# Patient Record
Sex: FEMALE | Race: WHITE | NOT HISPANIC OR LATINO | URBAN - METROPOLITAN AREA
[De-identification: names, ages, dates, MRNs, and addresses within clinical notes are randomized per-mention and may not be internally consistent; named-entity substitution may affect disease eponyms.]

---
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
## 2023-02-17 ENCOUNTER — OFFICE VISIT (OUTPATIENT)
Dept: FAMILY MEDICINE CLINIC | Facility: CLINIC | Age: 23
End: 2023-02-17
Payer: COMMERCIAL

## 2023-02-17 VITALS
DIASTOLIC BLOOD PRESSURE: 60 MMHG | SYSTOLIC BLOOD PRESSURE: 102 MMHG | WEIGHT: 135 LBS | TEMPERATURE: 98.2 F | OXYGEN SATURATION: 99 % | HEIGHT: 61 IN | HEART RATE: 71 BPM | BODY MASS INDEX: 25.49 KG/M2

## 2023-02-17 DIAGNOSIS — F32.A ANXIETY AND DEPRESSION: ICD-10-CM

## 2023-02-17 DIAGNOSIS — N30.10 INTERSTITIAL CYSTITIS: ICD-10-CM

## 2023-02-17 DIAGNOSIS — Z00.00 ANNUAL PHYSICAL EXAM: ICD-10-CM

## 2023-02-17 DIAGNOSIS — F41.9 ANXIETY AND DEPRESSION: ICD-10-CM

## 2023-02-17 DIAGNOSIS — L20.9 ATOPIC DERMATITIS, UNSPECIFIED TYPE: ICD-10-CM

## 2023-02-17 DIAGNOSIS — Z76.89 ENCOUNTER TO ESTABLISH CARE: Primary | ICD-10-CM

## 2023-02-17 LAB
ALBUMIN SERPL-MCNC: 4.7 G/DL (ref 3.5–5.2)
ALBUMIN/GLOB SERPL: 2.2 G/DL
ALP SERPL-CCNC: 85 U/L (ref 39–117)
ALT SERPL W P-5'-P-CCNC: 25 U/L (ref 1–33)
ANION GAP SERPL CALCULATED.3IONS-SCNC: 8.9 MMOL/L (ref 5–15)
AST SERPL-CCNC: 19 U/L (ref 1–32)
BASOPHILS # BLD AUTO: 0.02 10*3/MM3 (ref 0–0.2)
BASOPHILS NFR BLD AUTO: 0.2 % (ref 0–1.5)
BILIRUB SERPL-MCNC: 0.7 MG/DL (ref 0–1.2)
BILIRUB UR QL STRIP: NEGATIVE
BUN SERPL-MCNC: 8 MG/DL (ref 6–20)
BUN/CREAT SERPL: 11.9 (ref 7–25)
CALCIUM SPEC-SCNC: 9.6 MG/DL (ref 8.6–10.5)
CHLORIDE SERPL-SCNC: 103 MMOL/L (ref 98–107)
CHOLEST SERPL-MCNC: 182 MG/DL (ref 0–200)
CLARITY UR: CLEAR
CO2 SERPL-SCNC: 30.1 MMOL/L (ref 22–29)
COLOR UR: YELLOW
CREAT SERPL-MCNC: 0.67 MG/DL (ref 0.57–1)
DEPRECATED RDW RBC AUTO: 39.6 FL (ref 37–54)
EGFRCR SERPLBLD CKD-EPI 2021: 128.5 ML/MIN/1.73
EOSINOPHIL # BLD AUTO: 0.27 10*3/MM3 (ref 0–0.4)
EOSINOPHIL NFR BLD AUTO: 3.2 % (ref 0.3–6.2)
ERYTHROCYTE [DISTWIDTH] IN BLOOD BY AUTOMATED COUNT: 11.7 % (ref 12.3–15.4)
GLOBULIN UR ELPH-MCNC: 2.1 GM/DL
GLUCOSE SERPL-MCNC: 103 MG/DL (ref 65–99)
GLUCOSE UR STRIP-MCNC: NEGATIVE MG/DL
HCT VFR BLD AUTO: 44.2 % (ref 34–46.6)
HDLC SERPL-MCNC: 34 MG/DL (ref 40–60)
HGB BLD-MCNC: 15.3 G/DL (ref 12–15.9)
HGB UR QL STRIP.AUTO: ABNORMAL
IMM GRANULOCYTES # BLD AUTO: 0.02 10*3/MM3 (ref 0–0.05)
IMM GRANULOCYTES NFR BLD AUTO: 0.2 % (ref 0–0.5)
KETONES UR QL STRIP: ABNORMAL
LDLC SERPL CALC-MCNC: 104 MG/DL (ref 0–100)
LDLC/HDLC SERPL: 2.84 {RATIO}
LEUKOCYTE ESTERASE UR QL STRIP.AUTO: NEGATIVE
LYMPHOCYTES # BLD AUTO: 1.85 10*3/MM3 (ref 0.7–3.1)
LYMPHOCYTES NFR BLD AUTO: 21.7 % (ref 19.6–45.3)
MCH RBC QN AUTO: 32.1 PG (ref 26.6–33)
MCHC RBC AUTO-ENTMCNC: 34.6 G/DL (ref 31.5–35.7)
MCV RBC AUTO: 92.7 FL (ref 79–97)
MONOCYTES # BLD AUTO: 0.55 10*3/MM3 (ref 0.1–0.9)
MONOCYTES NFR BLD AUTO: 6.5 % (ref 5–12)
NEUTROPHILS NFR BLD AUTO: 5.81 10*3/MM3 (ref 1.7–7)
NEUTROPHILS NFR BLD AUTO: 68.2 % (ref 42.7–76)
NITRITE UR QL STRIP: NEGATIVE
NRBC BLD AUTO-RTO: 0 /100 WBC (ref 0–0.2)
PH UR STRIP.AUTO: 6 [PH] (ref 5–8)
PLATELET # BLD AUTO: 282 10*3/MM3 (ref 140–450)
PMV BLD AUTO: 10.8 FL (ref 6–12)
POTASSIUM SERPL-SCNC: 3.5 MMOL/L (ref 3.5–5.2)
PROT SERPL-MCNC: 6.8 G/DL (ref 6–8.5)
PROT UR QL STRIP: NEGATIVE
RBC # BLD AUTO: 4.77 10*6/MM3 (ref 3.77–5.28)
SODIUM SERPL-SCNC: 142 MMOL/L (ref 136–145)
SP GR UR STRIP: 1.02 (ref 1–1.03)
T-UPTAKE NFR SERPL: 1.04 TBI (ref 0.8–1.3)
T4 SERPL-MCNC: 7.86 MCG/DL (ref 4.5–11.7)
TRIGL SERPL-MCNC: 258 MG/DL (ref 0–150)
TSH SERPL DL<=0.05 MIU/L-ACNC: 1.04 UIU/ML (ref 0.27–4.2)
UROBILINOGEN UR QL STRIP: ABNORMAL
VLDLC SERPL-MCNC: 44 MG/DL (ref 5–40)
WBC NRBC COR # BLD: 8.52 10*3/MM3 (ref 3.4–10.8)

## 2023-02-17 PROCEDURE — 99385 PREV VISIT NEW AGE 18-39: CPT

## 2023-02-17 PROCEDURE — 84479 ASSAY OF THYROID (T3 OR T4): CPT

## 2023-02-17 PROCEDURE — 2014F MENTAL STATUS ASSESS: CPT

## 2023-02-17 PROCEDURE — 3008F BODY MASS INDEX DOCD: CPT

## 2023-02-17 PROCEDURE — 84436 ASSAY OF TOTAL THYROXINE: CPT

## 2023-02-17 PROCEDURE — 85025 COMPLETE CBC W/AUTO DIFF WBC: CPT

## 2023-02-17 PROCEDURE — 80061 LIPID PANEL: CPT

## 2023-02-17 PROCEDURE — 80053 COMPREHEN METABOLIC PANEL: CPT

## 2023-02-17 PROCEDURE — 84443 ASSAY THYROID STIM HORMONE: CPT

## 2023-02-17 PROCEDURE — 81001 URINALYSIS AUTO W/SCOPE: CPT

## 2023-02-17 RX ORDER — DESONIDE 0.5 MG/G
1 OINTMENT TOPICAL 2 TIMES DAILY
Qty: 60 G | Refills: 1 | Status: SHIPPED | OUTPATIENT
Start: 2023-02-17

## 2023-02-17 NOTE — PROGRESS NOTES
Chief Complaint  Chief Complaint   Patient presents with   • Establish Care       Subjective      Danni Solomon presents to [facility]  History of Present Illness  Patient presents today to establish care. She has not seen a PCP in several years. She has seen a therapist for her mental health in the past but hasn't seen anyone recently.    She reports a history of a bladder problem, possibly interstitial cystitis. Urinary symptoms include urgency, frequency. She does drink a lot of water to avoid flare ups of the interstitial cystitis. These symptoms are sporadic and random. She denies any current pain with urination.     PHQ 9 score of 15. She has seen mental health providers in the past and was prescribed medications, but she did not want to take them.  Today she is interested in seeing a new therapist to discuss her depression and anxiety.  She denies any suicidal or homicidal ideations at this time.    Discussed immunizations and patient declines any additional immunizations at this time.    Objective     Medical History:  Past Medical History:   Diagnosis Date   • Anxiety    • Broken bones    • Depression    • Forgetfulness      Past Surgical History:   Procedure Laterality Date   • SYNDACTYLY REPAIR      Webbed feet      Social History     Tobacco Use   • Smoking status: Never   • Smokeless tobacco: Never   Substance Use Topics   • Alcohol use: Yes     Comment: rarely   • Drug use: Not Currently     Types: Marijuana     Family History   Problem Relation Age of Onset   • Heart disease Mother        Medications:  Prior to Admission medications    Not on File        Allergies:   Patient has no known allergies.    Health Maintenance Due   Topic Date Due   • COVID-19 Vaccine (1) Never done   • HPV VACCINES (1 - 2-dose series) Never done   • HEPATITIS C SCREENING  Never done   • ANNUAL PHYSICAL  Never done   • INFLUENZA VACCINE  Never done         Vital Signs:   /60  "  Pulse 71   Temp 98.2 °F (36.8 °C)   Ht 154.9 cm (61\")   Wt 61.2 kg (135 lb)   SpO2 99%   BMI 25.51 kg/m²     Wt Readings from Last 3 Encounters:   02/17/23 61.2 kg (135 lb)   10/17/19 55.8 kg (123 lb) (51 %, Z= 0.01)*   10/15/19 50.8 kg (112 lb) (27 %, Z= -0.62)*     * Growth percentiles are based on CDC (Girls, 2-20 Years) data.     BP Readings from Last 3 Encounters:   02/17/23 102/60   10/17/19 120/70 (87 %, Z = 1.13 /  75 %, Z = 0.67)*   02/12/19 102/68 (30 %, Z = -0.52 /  68 %, Z = 0.47)*     *BP percentiles are based on the 2017 AAP Clinical Practice Guideline for girls          Physical Exam  Vitals reviewed.   Constitutional:       Appearance: Normal appearance. She is well-developed.   HENT:      Head: Normocephalic and atraumatic.   Eyes:      Conjunctiva/sclera: Conjunctivae normal.      Pupils: Pupils are equal, round, and reactive to light.   Cardiovascular:      Rate and Rhythm: Normal rate and regular rhythm.      Heart sounds: No murmur heard.    No friction rub. No gallop.   Pulmonary:      Effort: Pulmonary effort is normal.      Breath sounds: Normal breath sounds. No wheezing or rhonchi.   Abdominal:      General: Bowel sounds are normal. There is no distension.      Palpations: Abdomen is soft.      Tenderness: There is no abdominal tenderness.   Skin:     General: Skin is warm and dry.   Neurological:      Mental Status: She is alert and oriented to person, place, and time.      Cranial Nerves: No cranial nerve deficit.   Psychiatric:         Mood and Affect: Affect normal. Mood is anxious.         Behavior: Behavior normal.         Thought Content: Thought content normal.         Judgment: Judgment normal.          Result Review :    The following data was reviewed by TRENA Back on 02/17/23 at 15:50 EST:        No Images in the past 120 days found..                  Assessment and Plan    Diagnoses and all orders for this visit:    1. Encounter to establish care " (Primary)    2. Annual physical exam  -     CBC & Differential  -     Comprehensive Metabolic Panel  -     Lipid Panel  -     Thyroid Panel With TSH  -     Urinalysis With Culture If Indicated -    3. Anxiety and depression  Assessment & Plan:  Patient declines medication at this time.  She was provided a list of local therapists so that she can schedule an appointment and find someone that she has a good relationship with to discuss her anxiety and any other evaluation she may need.      4. Interstitial cystitis  Assessment & Plan:  Advised patient to increase water intake and avoid caffeine as this can be an irritant to the bladder.  Urinalysis with culture if indicated to be collected today.      5. Atopic dermatitis, unspecified type  -     desonide (DESOWEN) 0.05 % ointment; Apply 1 application topically to the appropriate area as directed 2 (Two) Times a Day.  Dispense: 60 g; Refill: 1     Patient will follow-up with me in 3 months or sooner if needed to continue monitoring and discussing anxiety and depression after she establishes with a therapist and determines if she needs additional evaluation for proper diagnosis.  At that time we will decide which medication would be most appropriate for the patient.      Smoking Cessation:    Danni Solomon  reports that she has never smoked. She has never used smokeless tobacco.            Follow Up   Return in about 3 months (around 5/17/2023) for Next scheduled follow up.  Patient was given instructions and counseling regarding her condition or for health maintenance advice. Please see specific information pulled into the AVS if appropriate.     Please note that portions of this note were completed with a voice recognition program.

## 2023-02-17 NOTE — ASSESSMENT & PLAN NOTE
Advised patient to increase water intake and avoid caffeine as this can be an irritant to the bladder.  Urinalysis with culture if indicated to be collected today.

## 2023-02-17 NOTE — ASSESSMENT & PLAN NOTE
Patient declines medication at this time.  She was provided a list of local therapists so that she can schedule an appointment and find someone that she has a good relationship with to discuss her anxiety and any other evaluation she may need.

## 2023-02-18 LAB
BACTERIA UR QL AUTO: ABNORMAL /HPF
HYALINE CASTS UR QL AUTO: ABNORMAL /LPF
MUCOUS THREADS URNS QL MICRO: ABNORMAL /HPF
RBC # UR STRIP: ABNORMAL /HPF
REF LAB TEST METHOD: ABNORMAL
SQUAMOUS #/AREA URNS HPF: ABNORMAL /HPF
WBC # UR STRIP: ABNORMAL /HPF

## 2023-05-17 ENCOUNTER — OFFICE VISIT (OUTPATIENT)
Dept: FAMILY MEDICINE CLINIC | Facility: CLINIC | Age: 23
End: 2023-05-17
Payer: COMMERCIAL

## 2023-05-17 VITALS
HEIGHT: 61 IN | HEART RATE: 79 BPM | TEMPERATURE: 98.5 F | DIASTOLIC BLOOD PRESSURE: 78 MMHG | BODY MASS INDEX: 24.81 KG/M2 | OXYGEN SATURATION: 99 % | SYSTOLIC BLOOD PRESSURE: 124 MMHG | WEIGHT: 131.4 LBS

## 2023-05-17 DIAGNOSIS — F32.A ANXIETY AND DEPRESSION: Primary | ICD-10-CM

## 2023-05-17 DIAGNOSIS — F41.9 ANXIETY AND DEPRESSION: Primary | ICD-10-CM

## 2023-05-17 DIAGNOSIS — L21.9 SEBORRHEIC DERMATITIS: ICD-10-CM

## 2023-05-17 DIAGNOSIS — Z13.39 ADHD (ATTENTION DEFICIT HYPERACTIVITY DISORDER) EVALUATION: ICD-10-CM

## 2023-05-17 RX ORDER — KETOCONAZOLE 20 MG/ML
SHAMPOO TOPICAL 2 TIMES WEEKLY
Qty: 120 ML | Refills: 1 | Status: SHIPPED | OUTPATIENT
Start: 2023-05-18

## 2023-05-17 NOTE — PROGRESS NOTES
Chief Complaint  Chief Complaint   Patient presents with   • Anxiety   • Depression   • Hair/Scalp Problem     Dermatology referral, sores on scalp, x2 years        Subjective      Danni Solomon presents to [facility]  History of Present Illness  Patient presents today for follow-up visit on depression/anxiety, dermatitis.      Depression/anxiety: Patient was previously provided a list of local therapists but she lost the list and has not been able to find a therapist or schedule an appointment for further discussion and evaluation.  Patient states that she has a strong family history of ADHD and feels that she has many of the symptoms herself including inability to stay focused and concentrate, inability to complete tasks.    Seborrheic dermatitis: Patient complains of persistent dry skin and sores on her scalp.  She has tried many different shampoos including tea tree oil shampoo with no improvement.  The area is itchy and bothersome to her.      Objective     Medical History:  Past Medical History:   Diagnosis Date   • Anxiety    • Broken bones    • Depression    • Forgetfulness      Past Surgical History:   Procedure Laterality Date   • SYNDACTYLY REPAIR      Webbed feet      Social History     Tobacco Use   • Smoking status: Never   • Smokeless tobacco: Never   Vaping Use   • Vaping Use: Some days   Substance Use Topics   • Alcohol use: Yes     Comment: rarely   • Drug use: Not Currently     Types: Marijuana     Family History   Problem Relation Age of Onset   • Heart disease Mother        Medications:  Prior to Admission medications    Medication Sig Start Date End Date Taking? Authorizing Provider   desonide (DESOWEN) 0.05 % ointment Apply 1 application topically to the appropriate area as directed 2 (Two) Times a Day. 2/17/23  Yes Laura Rey APRN        Allergies:   Patient has no known allergies.    Health Maintenance Due   Topic Date Due   •  "COVID-19 Vaccine (1) Never done   • HPV VACCINES (1 - 2-dose series) Never done   • HEPATITIS C SCREENING  Never done   • PAP SMEAR  Never done         Vital Signs:   /78   Pulse 79   Temp 98.5 °F (36.9 °C)   Ht 154.9 cm (61\")   Wt 59.6 kg (131 lb 6.4 oz)   SpO2 99%   BMI 24.83 kg/m²     Wt Readings from Last 3 Encounters:   05/17/23 59.6 kg (131 lb 6.4 oz)   02/17/23 61.2 kg (135 lb)   10/17/19 55.8 kg (123 lb) (51 %, Z= 0.01)*     * Growth percentiles are based on [facility] (Girls, 2-20 Years) data.     BP Readings from Last 3 Encounters:   05/17/23 124/78   02/17/23 102/60   10/17/19 120/70 (87 %, Z = 1.13 /  75 %, Z = 0.67)*     *BP percentiles are based on the 2017 AAP Clinical Practice Guideline for girls       BMI is within normal parameters. No other follow-up for BMI required.       Physical Exam  Vitals reviewed.   Constitutional:       Appearance: Normal appearance. She is well-developed.   HENT:      Head: Normocephalic and atraumatic.   Eyes:      Conjunctiva/sclera: Conjunctivae normal.      Pupils: Pupils are equal, round, and reactive to light.   Cardiovascular:      Rate and Rhythm: Normal rate and regular rhythm.      Heart sounds: No murmur heard.    No friction rub. No gallop.   Pulmonary:      Effort: Pulmonary effort is normal.      Breath sounds: Normal breath sounds. No wheezing or rhonchi.   Abdominal:      General: Bowel sounds are normal. There is no distension.      Palpations: Abdomen is soft.      Tenderness: There is no abdominal tenderness.   Skin:     General: Skin is warm and dry.      Findings: Lesion and rash present.      Comments: Multiple scabbed areas to scalp with dry skin   Neurological:      Mental Status: She is alert and oriented to person, place, and time.      Cranial Nerves: No cranial nerve deficit.   Psychiatric:         Mood and Affect: Affect normal. Mood is anxious.         Behavior: Behavior normal.         Thought Content: Thought content normal.         " Judgment: Judgment normal.          Result Review :    The following data was reviewed by TRENA Back on 05/17/23 at 16:23 EDT:    Common labs        2/17/2023    16:13   Common Labs   Glucose 103     BUN 8     Creatinine 0.67     Sodium 142     Potassium 3.5     Chloride 103     Calcium 9.6     Albumin 4.7     Total Bilirubin 0.7     Alkaline Phosphatase 85     AST (SGOT) 19     ALT (SGPT) 25     WBC 8.52     Hemoglobin 15.3     Hematocrit 44.2     Platelets 282     Total Cholesterol 182     Triglycerides 258     HDL Cholesterol 34     LDL Cholesterol  104         No Images in the past 120 days found..                  Assessment and Plan    Diagnoses and all orders for this visit:    1. Anxiety and depression (Primary)  -     Ambulatory Referral to Psychiatry    2. Seborrheic dermatitis  -     ketoconazole (NIZORAL) 2 % shampoo; Apply  topically to the appropriate area as directed 2 (Two) Times a Week.  Dispense: 120 mL; Refill: 1    3. ADHD (attention deficit hyperactivity disorder) evaluation  -     Ambulatory Referral to Psychiatry    Patient was provided additional list of local therapists so that she can contact someone to schedule an initial appointment.  Patient will be referred to psychiatry for further evaluation of possible ADHD and ongoing discussion of depression and anxiety and to discuss proper medications for patient's mental health.  Patient encouraged to begin use of ketoconazole shampoo twice weekly for treatment of seborrheic dermatitis to the scalp.  If treatment is ineffective patient would like to be referred to dermatology for further evaluation.  Patient advised to follow-up with me in 6 months or sooner if needed.  At that time we will discuss indicated screenings such as well woman exam with Pap smear.        Smoking Cessation:    Danni Hightower Dulcerodrigo  reports that she has never smoked. She has never used smokeless tobacco.            Follow Up   Return in about 6  months (around 11/17/2023) for Next scheduled follow up.  Patient was given instructions and counseling regarding her condition or for health maintenance advice. Please see specific information pulled into the AVS if appropriate.     Please note that portions of this note were completed with a voice recognition program.

## 2023-08-08 ENCOUNTER — TELEMEDICINE (OUTPATIENT)
Dept: BEHAVIORAL HEALTH | Facility: CLINIC | Age: 23
End: 2023-08-08
Payer: COMMERCIAL

## 2023-08-08 DIAGNOSIS — F33.2 SEVERE EPISODE OF RECURRENT MAJOR DEPRESSIVE DISORDER, WITHOUT PSYCHOTIC FEATURES: ICD-10-CM

## 2023-08-08 DIAGNOSIS — F41.0 PANIC DISORDER: ICD-10-CM

## 2023-08-08 DIAGNOSIS — F33.2 SEVERE EPISODE OF RECURRENT MAJOR DEPRESSIVE DISORDER, WITHOUT PSYCHOTIC FEATURES: Primary | ICD-10-CM

## 2023-08-08 DIAGNOSIS — F99 INSOMNIA DUE TO OTHER MENTAL DISORDER: ICD-10-CM

## 2023-08-08 DIAGNOSIS — F41.1 GENERALIZED ANXIETY DISORDER: ICD-10-CM

## 2023-08-08 DIAGNOSIS — F51.05 INSOMNIA DUE TO OTHER MENTAL DISORDER: ICD-10-CM

## 2023-08-08 RX ORDER — SERTRALINE HYDROCHLORIDE 100 MG/1
100 TABLET, FILM COATED ORAL DAILY
Qty: 30 TABLET | Refills: 1 | Status: SHIPPED | OUTPATIENT
Start: 2023-08-08 | End: 2023-08-08 | Stop reason: SDUPTHER

## 2023-08-08 NOTE — TELEPHONE ENCOUNTER
Pt pharmacy faxed medication adjustment request for Sertraline 100mg for 90 day supply per pt request.     sertraline (Zoloft) 100 MG tablet (08/08/2023)     Medication order pended for 90 day supply, please review.
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
## 2023-08-09 RX ORDER — SERTRALINE HYDROCHLORIDE 100 MG/1
100 TABLET, FILM COATED ORAL DAILY
Qty: 90 TABLET | Refills: 0 | Status: SHIPPED | OUTPATIENT
Start: 2023-08-09 | End: 2023-11-07

## 2023-09-08 DIAGNOSIS — L21.9 SEBORRHEIC DERMATITIS: Primary | ICD-10-CM

## 2023-12-15 ENCOUNTER — TELEPHONE (OUTPATIENT)
Dept: PSYCHIATRY | Facility: CLINIC | Age: 23
End: 2023-12-15
Payer: COMMERCIAL

## 2023-12-15 NOTE — TELEPHONE ENCOUNTER
Patient was referred out there therapy, several attempts made to follow up on referral order including mailing a letter with no success, closing out referral

## 2024-10-03 ENCOUNTER — HOSPITAL ENCOUNTER (OUTPATIENT)
Dept: OTHER | Facility: HOSPITAL | Age: 24
Discharge: HOME | End: 2024-10-03

## 2025-04-24 ENCOUNTER — PATIENT MESSAGE (OUTPATIENT)
Dept: NEUROLOGY | Facility: CLINIC | Age: 25
End: 2025-04-24
Payer: COMMERCIAL

## 2025-04-30 ENCOUNTER — OFFICE VISIT (OUTPATIENT)
Dept: NEUROLOGY | Facility: CLINIC | Age: 25
End: 2025-04-30
Payer: COMMERCIAL

## 2025-04-30 VITALS
WEIGHT: 154.1 LBS | HEIGHT: 61 IN | HEART RATE: 83 BPM | SYSTOLIC BLOOD PRESSURE: 106 MMHG | BODY MASS INDEX: 29.09 KG/M2 | DIASTOLIC BLOOD PRESSURE: 54 MMHG

## 2025-04-30 DIAGNOSIS — G93.5 CHIARI MALFORMATION TYPE I: Primary | ICD-10-CM

## 2025-04-30 DIAGNOSIS — Z72.0 TOBACCO USE: ICD-10-CM

## 2025-05-01 ENCOUNTER — PATIENT ROUNDING (BHMG ONLY) (OUTPATIENT)
Dept: NEUROLOGY | Facility: CLINIC | Age: 25
End: 2025-05-01
Payer: COMMERCIAL

## 2025-05-05 NOTE — PROGRESS NOTES
"Chief Complaint  Migraine    Subjective          Danni Solomon presents to [facility]  History of Present Illness    History of Present Illness  The patient is a 25-year-old female who presents to the office for an initial consult for headaches.    Headaches were previously a daily occurrence, localized to a specific area on the right side of the head, characterized by an indentation. Concurrent ear pain on the same side was also reported. Over the past 6 months, there has been a significant reduction in the frequency of these headaches. Initially, headache medication was prescribed and taken for several weeks, which was found to be beneficial. Currently, severe symptoms are not experienced, but there is concern about the Chiari malformation and the indentation.    An MRI conducted in 10/2024 revealed a Chiari I malformation without upper cervical cord syrinx or compression. Concerns about the potential worsening of the condition were expressed, and the need for periodic monitoring was discussed.    MEDICATIONS  PREVIOUS MEDS:  Headache Medicine       Objective   Vital Signs:   /54   Pulse 83   Ht 154.9 cm (60.98\")   Wt 69.9 kg (154 lb 1.6 oz)   BMI 29.14 kg/m²     Physical Exam  HENT:      Head: Normocephalic.   Pulmonary:      Effort: Pulmonary effort is normal.   Neurological:      Mental Status: She is alert and oriented to person, place, and time.      Sensory: Sensation is intact.      Motor: Motor function is intact.      Coordination: Coordination is intact.      Deep Tendon Reflexes: Reflexes are normal and symmetric.        Neurological Exam  Mental Status  Alert. Oriented to person, place, and time.    Sensory  Normal sensation.    Reflexes  Deep tendon reflexes are 2+ and symmetric in all four extremities.    Coordination    Finger-to-nose, rapid alternating movements and heel-to-shin normal bilaterally without dysmetria.      Result Review " :             MRI Brain:   Mild chiari 1 malformation    Assessment and Plan    Diagnoses and all orders for this visit:    1. Chiari malformation type I (Primary)    2. Tobacco use        Assessment & Plan  1. Chiari I Malformation.  The patient's Chiari I malformation is mild and not causing any symptoms. The cerebellar tonsils have not extended into the spinal canal, and there is no impingement on the spinal cord. The indentation on the right side of her head is due to fat redistribution and is not a cause for concern. No treatment is necessary at this time. She is advised to inform her obstetrician about her Chiari I malformation during pregnancy. A follow-up appointment will be scheduled in 1 year to assess her condition and discuss the possibility of ordering repeat imaging. If her headaches worsen before the follow-up, she should contact the office.    2. Headaches.  She reports that her headaches have significantly decreased in frequency over the past 6 months. Initially, she experienced daily headaches, but they have since improved. She was previously on headache medication, which helped alleviate the symptoms. Currently, no treatment is necessary as the headaches are not frequent or severe.    Follow-up  The patient will follow up in 1 year.       I spent 35 minutes caring for Danni on this date of service. This time includes time spent by me in the following activities:preparing for the visit, reviewing tests, obtaining and/or reviewing a separately obtained history, performing a medically appropriate examination and/or evaluation , counseling and educating the patient/family/caregiver, ordering medications, tests, or procedures, documenting information in the medical record, and independently interpreting results and communicating that information with the patient/family/caregiver  Follow Up   Return in about 1 year (around 4/30/2026).  Patient was given instructions and counseling regarding her  condition or for health maintenance advice. Please see specific information pulled into the AVS if appropriate.       Patient or patient representative verbalized consent for the use of Ambient Listening during the visit with  TRENA Phillips for chart documentation. 5/5/2025  10:48 EDT